# Patient Record
Sex: MALE | Race: WHITE
[De-identification: names, ages, dates, MRNs, and addresses within clinical notes are randomized per-mention and may not be internally consistent; named-entity substitution may affect disease eponyms.]

---

## 2020-03-12 ENCOUNTER — HOSPITAL ENCOUNTER (EMERGENCY)
Dept: HOSPITAL 47 - EC | Age: 35
Discharge: HOME | End: 2020-03-12
Payer: COMMERCIAL

## 2020-03-12 VITALS
DIASTOLIC BLOOD PRESSURE: 95 MMHG | SYSTOLIC BLOOD PRESSURE: 138 MMHG | HEART RATE: 90 BPM | RESPIRATION RATE: 18 BRPM | TEMPERATURE: 98.4 F

## 2020-03-12 DIAGNOSIS — S39.012A: Primary | ICD-10-CM
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
## 2020-03-12 PROCEDURE — 72100 X-RAY EXAM L-S SPINE 2/3 VWS: CPT

## 2020-03-12 PROCEDURE — 99283 EMERGENCY DEPT VISIT LOW MDM: CPT

## 2020-03-12 PROCEDURE — 96372 THER/PROPH/DIAG INJ SC/IM: CPT

## 2020-03-12 NOTE — XR
PROCEDURE: XR lumbar spine- 3V

DATE AND TIME: 3/12/2020 7:30 PM

 

CLINICAL INDICATION: [facility]; pain

 

TECHNIQUE: Department protocol

 

COMPARISON: None

 

FINDINGS: There is no fracture or malalignment. 

 

No focal skeletal findings. 

 

The AP view shows mild levocurvature, but this may be simply positional. 

 

The soft tissues are unremarkable.

 

IMPRESSION:

NO ACUTE PROCESS.

## 2020-03-12 NOTE — ED
Back Pain HPI





- General


Chief Complaint: Back Pain/Injury


Stated Complaint: Back pain


Time Seen by Provider: 03/12/20 18:48


Source: patient


Limitations: no limitations





- History of Present Illness


Initial Comments: 





Patient is a 34-year-old male presenting to emergency Department with complaints

of low back pain 4 days.  Patient states he was sitting cross leg is working on

a vehicle for most of the day on Sunday and then started having lower back pain.

 Patient states he does normally have low back pain intermittently but states 

this has not been getting better.  Patient states today while he was standing he

had an episode of pain shooting down both of his legs.  He denies any saddle 

paresthesias, bowel or bladder incontinence.  He denies fever, chills.  He 

denies any previous fractures or surgery his low back.  Patient is ambulatory.  

There are no other complaints at this time.  Upon arrival to the ER, his vital 

signs are stable.





- Related Data


                                  Previous Rx's











 Medication  Instructions  Recorded


 


Cyclobenzaprine [Flexeril] 5 mg PO BID PRN #10 tablet 03/12/20











                                    Allergies











Allergy/AdvReac Type Severity Reaction Status Date / Time


 


No Known Allergies Allergy   Verified 03/12/20 18:25














Review of Systems


ROS Statement: 


Those systems with pertinent positive or pertinent negative responses have been 

documented in the HPI.





ROS Other: All systems not noted in ROS Statement are negative.





Past Medical History


Past Medical History: Asthma


History of Any Multi-Drug Resistant Organisms: None Reported


Past Surgical History: No Surgical Hx Reported


Past Psychological History: No Psychological Hx Reported


Smoking Status: Never smoker


Past Alcohol Use History: None Reported


Past Drug Use History: None Reported





General Exam





- General Exam Comments


Initial Comments: 





GENERAL: 


Well-appearing, well-nourished and in no acute distress.  Appears uncomfortable 

while standing.





HEAD: 


Atraumatic, normocephalic.





EYES:


Pupils equal round and reactive to light, extraocular movements intact, sclera 

anicteric, conjunctiva are normal.





ENT: 


TMs normal, nares patent, oropharynx clear without exudates.  Moist mucous mem

branes.





NECK: 


Normal range of motion, supple without lymphadenopathy or JVD.





LUNGS:


 Breath sounds clear to auscultation bilaterally and equal.  No wheezes rales or

 rhonchi.





HEART:


Regular rate and rhythm without murmurs, rubs or gallops.





ABDOMEN: 


Soft, nontender, normoactive bowel sounds.  No guarding, no rebound.  No masses 

appreciated.





: Deferred 





EXTREMITIES: 


Patient is able to ambulate.  Pain with palpation of lumbar paraspinals, 

bilaterally.  Complete muscle spasms on the left greater than right.  Patient 

has pain with trunk flexion.  Normal trunk extension.  Patient has equal and 

bilateral sensation of the lower extremities.  He does have 5 out of 5 strength 

of lower extremities although pain with hip flexion.





NEUROLOGICAL: 


Normal speech, normal gait.





PSYCH:


Normal mood, normal affect.





SKIN:


 Warm, Dry, normal turgor, no rashes or lesions noted.


Limitations: no limitations





Course


                                   Vital Signs











  03/12/20





  18:26


 


Temperature 98.4 F


 


Pulse Rate 90


 


Respiratory 18





Rate 


 


Blood Pressure 138/95


 


O2 Sat by Pulse 99





Oximetry 














Medical Decision Making





- Medical Decision Making





Patient is a 34-year-old male presenting with low back pain 4 days.  No trauma 

or falls.  This appears muscle skeletal in nature.  Patient was given Toradol 

and Valium.  X-rays of the lumbar spine reveal no acute abnormalities.  I 

discussed with patient this is most likely muscle skeletal in nature.  He 

reports just a very mild improvement in symptoms from the medications today.  

Patient will be sent home with muscle relaxer as well as a small dose of pain 

medicine.  He will follow up with PCP if symptoms persist after one to 2 weeks. 

 He is in agreement with this plan of care.  Return parameters were discussed 

with the patient and he verbalized understanding.





Disposition


Clinical Impression: 


 Strain of lumbar region





Disposition: HOME SELF-CARE


Condition: Stable


Instructions (If sedation given, give patient instructions):  Acute Low Back 

Pain (ED)


Additional Instructions: 


Please return to the Emergency Department if symptoms worsen or any other 

concerns.


Continue with muscle relaxant as discussed.  Use heat to the area.  Tylenol or 

ibuprofen for pain relief.


Prescriptions: 


Cyclobenzaprine [Flexeril] 5 mg PO BID PRN #10 tablet


 PRN Reason: Muscle Spasm


Is patient prescribed a controlled substance at d/c from ED?: No


Referrals: 


Romy Salazar MD [Primary Care Provider] - 1-2 days